# Patient Record
Sex: MALE | Race: WHITE | NOT HISPANIC OR LATINO | URBAN - NONMETROPOLITAN AREA
[De-identification: names, ages, dates, MRNs, and addresses within clinical notes are randomized per-mention and may not be internally consistent; named-entity substitution may affect disease eponyms.]

---

## 2023-12-26 RX ORDER — OMEPRAZOLE 20 MG/1
20 TABLET, DELAYED RELEASE ORAL
COMMUNITY

## 2023-12-26 RX ORDER — FLUOROURACIL 50 MG/G
CREAM TOPICAL 2 TIMES DAILY
COMMUNITY

## 2023-12-26 RX ORDER — TRAMADOL HYDROCHLORIDE 50 MG/1
50 TABLET ORAL
COMMUNITY

## 2023-12-26 RX ORDER — CYCLOBENZAPRINE HCL 5 MG
5 TABLET ORAL NIGHTLY
COMMUNITY

## 2023-12-26 RX ORDER — DICLOFENAC SODIUM 1 MG/ML
1 SOLUTION/ DROPS OPHTHALMIC 4 TIMES DAILY
COMMUNITY

## 2023-12-26 RX ORDER — ROSUVASTATIN CALCIUM 10 MG/1
10 TABLET, COATED ORAL DAILY
COMMUNITY

## 2023-12-26 RX ORDER — FUROSEMIDE 20 MG/1
20 TABLET ORAL DAILY
COMMUNITY

## 2023-12-26 RX ORDER — LISINOPRIL 40 MG/1
40 TABLET ORAL DAILY
COMMUNITY

## 2023-12-26 RX ORDER — SPIRONOLACTONE 25 MG/1
25 TABLET ORAL DAILY
COMMUNITY

## 2023-12-26 RX ORDER — CARVEDILOL 12.5 MG/1
TABLET ORAL
COMMUNITY
End: 2024-01-08 | Stop reason: DRUGHIGH

## 2024-01-08 ENCOUNTER — OFFICE VISIT (OUTPATIENT)
Dept: CARDIOLOGY | Facility: CLINIC | Age: 64
End: 2024-01-08
Payer: MEDICARE

## 2024-01-08 VITALS
HEIGHT: 68 IN | HEART RATE: 48 BPM | BODY MASS INDEX: 37.89 KG/M2 | DIASTOLIC BLOOD PRESSURE: 72 MMHG | SYSTOLIC BLOOD PRESSURE: 132 MMHG | WEIGHT: 250 LBS

## 2024-01-08 DIAGNOSIS — E78.2 MIXED HYPERLIPIDEMIA: ICD-10-CM

## 2024-01-08 DIAGNOSIS — I49.3 PVC (PREMATURE VENTRICULAR CONTRACTION): Primary | ICD-10-CM

## 2024-01-08 DIAGNOSIS — I42.8 NONISCHEMIC CARDIOMYOPATHY (MULTI): ICD-10-CM

## 2024-01-08 PROCEDURE — 99214 OFFICE O/P EST MOD 30 MIN: CPT | Performed by: INTERNAL MEDICINE

## 2024-01-08 PROCEDURE — 1036F TOBACCO NON-USER: CPT | Performed by: INTERNAL MEDICINE

## 2024-01-08 RX ORDER — CARVEDILOL 6.25 MG/1
6.25 TABLET ORAL
COMMUNITY

## 2024-01-08 ASSESSMENT — ENCOUNTER SYMPTOMS: DIZZINESS: 1

## 2024-01-08 NOTE — PROGRESS NOTES
"Subjective   Gopi Oquendo is a 63 y.o. male       Chief Complaint    Follow-up          HPI     Patient returns in follow-up of problems as noted.  He is doing well.  He denies any PVC symptomatology and he states [facility] other [facility] offices have noted that his rhythm is now regular without PVCs.  Because of this we suspect that treatment of his nonischemic cardiomyopathy with guideline directed therapy may have improved his cardiomyopathy and hence suppressed and/or eliminated a lot of the PVCs that seem to be associated with that pathophysiologic process.  Nonetheless it would be appropriate to reassess ejection fraction because it has been a year since it has been evaluated and previously it was only 40%.  He was educated regarding symptoms of heart failure and malignant arrhythmias to watch for and he states he has no such symptoms.    Treatment of matters such as blood pressure and lipids is reviewed and control is adequate and appropriate.  As before the merits of diet and weight loss were advocated.    Review of Systems   Neurological:  Positive for dizziness.   All other systems reviewed and are negative.         Visit Vitals  /72 (BP Location: Left arm, Patient Position: Sitting)   Pulse (!) 48   Ht 1.727 m (5' 8\")   Wt 113 kg (250 lb)   BMI 38.01 kg/m²   Smoking Status Never   BSA 2.33 m²        Objective   Physical Exam  Constitutional:       Appearance: Normal appearance. He is normal weight.   HENT:      Nose: Nose normal.   Neck:      Vascular: No carotid bruit.   Cardiovascular:      Rate and Rhythm: Normal rate.      Pulses: Normal pulses.      Heart sounds: Normal heart sounds.   Pulmonary:      Effort: Pulmonary effort is normal.   Abdominal:      General: Bowel sounds are normal.      Palpations: Abdomen is soft.   Genitourinary:     Rectum: Normal.   Musculoskeletal:         General: Normal range of motion.      Cervical back: Normal range of motion.      " Right lower leg: No edema.      Left lower leg: No edema.   Skin:     General: Skin is warm and dry.   Neurological:      General: No focal deficit present.      Mental Status: He is alert.   Psychiatric:         Mood and Affect: Mood normal.         Behavior: Behavior normal.         Thought Content: Thought content normal.         Judgment: Judgment normal.         Current Medications    Current Outpatient Medications:     abatacept (Orencia) 250 mg injection, Infuse 1,000 mg into a venous catheter 1 time.  Once a month, Disp: , Rfl:     carvedilol (Coreg) 6.25 mg tablet, Take 1 tablet (6.25 mg) by mouth 2 times a day with meals., Disp: , Rfl:     cyclobenzaprine (Flexeril) 5 mg tablet, Take 1 tablet (5 mg) by mouth once daily at bedtime., Disp: , Rfl:     diclofenac (Voltaren) 0.1 % ophthalmic solution, 1 drop 4 times a day., Disp: , Rfl:     fluorouracil (Efudex) 5 % cream, Apply topically 2 times a day., Disp: , Rfl:     furosemide (Lasix) 20 mg tablet, Take 1 tablet (20 mg) by mouth once daily., Disp: , Rfl:     lisinopril 40 mg tablet, Take 1 tablet (40 mg) by mouth once daily., Disp: , Rfl:     omeprazole OTC (PriLOSEC OTC) 20 mg EC tablet, Take 1 tablet (20 mg) by mouth once daily in the morning. Take before meals. Do not crush, chew, or split., Disp: , Rfl:     rosuvastatin (Crestor) 10 mg tablet, Take 1 tablet (10 mg) by mouth once daily., Disp: , Rfl:     spironolactone (Aldactone) 25 mg tablet, Take 1 tablet (25 mg) by mouth once daily., Disp: , Rfl:     traMADol (Ultram) 50 mg tablet, Take 1 tablet (50 mg) by mouth., Disp: , Rfl:        1. PVC (premature ventricular contraction)  No recurrence of PVC symptomatology.  Palpation suggest a significant reduced and PVC burden.        - Follow Up In Cardiology; Future  - Transthoracic Echo (TTE) Complete; Future    2. Nonischemic cardiomyopathy (CMS/HCC)  Previously ejection fraction 40%.  It has been a year since has been evaluated.  It would be appropriate  to determine if its gotten better or worse because obviously there is a change our plan of care.      - Follow Up In Cardiology; Future  - Transthoracic Echo (TTE) Complete; Future    3. Mixed hyperlipidemia  Adequate control on current therapy.

## 2024-01-08 NOTE — LETTER
"January 8, 2024     Erich Everett DO  [address] [facility] [id number]    Patient: Gopi Oquendo   YOB: 1960   Date of Visit: 1/8/2024       Dear Dr. Erich Everett DO:    Thank you for referring Gopi Oquendo to me for evaluation. Below are my notes for this consultation.  If you have questions, please do not hesitate to call me. I look forward to following your patient along with you.       Sincerely,     Don Altamirano MD      CC: No Recipients  ______________________________________________________________________________________    Subjective   Gopi Oquendo is a 63 y.o. male       Chief Complaint    Follow-up          HPI     Patient returns in follow-up of problems as noted.  He is doing well.  He denies any PVC symptomatology and he states that [facility] other [facility] have noted that his rhythm is now regular without PVCs.  Because of this we suspect that treatment of his nonischemic cardiomyopathy with guideline directed therapy may have improved his cardiomyopathy and hence suppressed and/or eliminated a lot of the PVCs that seem to be associated with that pathophysiologic process.  Nonetheless it would be appropriate to reassess ejection fraction because it has been a year since it has been evaluated and previously it was only 40%.  He was educated regarding symptoms of heart failure and malignant arrhythmias to watch for and he states he has no such symptoms.    Treatment of matters such as blood pressure and lipids is reviewed and control is adequate and appropriate.  As before the merits of diet and weight loss were advocated.    Review of Systems   Neurological:  Positive for dizziness.   All other systems reviewed and are negative.         Visit Vitals  /72 (BP Location: Left arm, Patient Position: Sitting)   Pulse (!) 48   Ht 1.727 m (5' 8\")   Wt 113 kg (250 lb)   BMI 38.01 kg/m²   Smoking Status Never "   BSA 2.33 m²        Objective   Physical Exam  Constitutional:       Appearance: Normal appearance. He is normal weight.   HENT:      Nose: Nose normal.   Neck:      Vascular: No carotid bruit.   Cardiovascular:      Rate and Rhythm: Normal rate.      Pulses: Normal pulses.      Heart sounds: Normal heart sounds.   Pulmonary:      Effort: Pulmonary effort is normal.   Abdominal:      General: Bowel sounds are normal.      Palpations: Abdomen is soft.   Genitourinary:     Rectum: Normal.   Musculoskeletal:         General: Normal range of motion.      Cervical back: Normal range of motion.      Right lower leg: No edema.      Left lower leg: No edema.   Skin:     General: Skin is warm and dry.   Neurological:      General: No focal deficit present.      Mental Status: He is alert.   Psychiatric:         Mood and Affect: Mood normal.         Behavior: Behavior normal.         Thought Content: Thought content normal.         Judgment: Judgment normal.         Current Medications    Current Outpatient Medications:   •  abatacept (Orencia) 250 mg injection, Infuse 1,000 mg into a venous catheter 1 time.  Once a month, Disp: , Rfl:   •  carvedilol (Coreg) 6.25 mg tablet, Take 1 tablet (6.25 mg) by mouth 2 times a day with meals., Disp: , Rfl:   •  cyclobenzaprine (Flexeril) 5 mg tablet, Take 1 tablet (5 mg) by mouth once daily at bedtime., Disp: , Rfl:   •  diclofenac (Voltaren) 0.1 % ophthalmic solution, 1 drop 4 times a day., Disp: , Rfl:   •  fluorouracil (Efudex) 5 % cream, Apply topically 2 times a day., Disp: , Rfl:   •  furosemide (Lasix) 20 mg tablet, Take 1 tablet (20 mg) by mouth once daily., Disp: , Rfl:   •  lisinopril 40 mg tablet, Take 1 tablet (40 mg) by mouth once daily., Disp: , Rfl:   •  omeprazole OTC (PriLOSEC OTC) 20 mg EC tablet, Take 1 tablet (20 mg) by mouth once daily in the morning. Take before meals. Do not crush, chew, or split., Disp: , Rfl:   •  rosuvastatin (Crestor) 10 mg tablet, Take 1  tablet (10 mg) by mouth once daily., Disp: , Rfl:   •  spironolactone (Aldactone) 25 mg tablet, Take 1 tablet (25 mg) by mouth once daily., Disp: , Rfl:   •  traMADol (Ultram) 50 mg tablet, Take 1 tablet (50 mg) by mouth., Disp: , Rfl:        1. PVC (premature ventricular contraction)  No recurrence of PVC symptomatology.  Palpation suggest a significant reduced and PVC burden.        - Follow Up In Cardiology; Future  - Transthoracic Echo (TTE) Complete; Future    2. Nonischemic cardiomyopathy (CMS/HCC)  Previously ejection fraction 40%.  It has been a year since has been evaluated.  It would be appropriate to determine if its gotten better or worse because obviously there is a change our plan of care.      - Follow Up In Cardiology; Future  - Transthoracic Echo (TTE) Complete; Future    3. Mixed hyperlipidemia  Adequate control on current therapy.

## 2024-01-24 ENCOUNTER — HOSPITAL ENCOUNTER (OUTPATIENT)
Dept: CARDIOLOGY | Facility: CLINIC | Age: 64
Discharge: HOME | End: 2024-01-24
Payer: MEDICARE

## 2024-01-24 VITALS
SYSTOLIC BLOOD PRESSURE: 134 MMHG | DIASTOLIC BLOOD PRESSURE: 68 MMHG | HEIGHT: 68 IN | BODY MASS INDEX: 37.89 KG/M2 | WEIGHT: 250 LBS

## 2024-01-24 DIAGNOSIS — I49.3 PVC (PREMATURE VENTRICULAR CONTRACTION): ICD-10-CM

## 2024-01-24 DIAGNOSIS — I42.8 NONISCHEMIC CARDIOMYOPATHY (MULTI): ICD-10-CM

## 2024-01-24 LAB
AORTIC VALVE MEAN GRADIENT: 2 MMHG
AORTIC VALVE PEAK VELOCITY: 1.14 M/S
AV PEAK GRADIENT: 5.2 MMHG
AVA (PEAK VEL): 3.44 CM2
AVA (VTI): 3.31 CM2
EJECTION FRACTION APICAL 4 CHAMBER: 65
LEFT VENTRICLE INTERNAL DIMENSION DIASTOLE: 6.1 CM (ref 3.5–6)
LEFT VENTRICULAR OUTFLOW TRACT DIAMETER: 2.3 CM
MITRAL VALVE E/A RATIO: 0.94
MITRAL VALVE E/E' RATIO: 12.8
RIGHT VENTRICLE PEAK SYSTOLIC PRESSURE: 24.9 MMHG

## 2024-01-24 PROCEDURE — 2500000004 HC RX 250 GENERAL PHARMACY W/ HCPCS (ALT 636 FOR OP/ED): Performed by: INTERNAL MEDICINE

## 2024-01-24 PROCEDURE — 93306 TTE W/DOPPLER COMPLETE: CPT | Performed by: INTERNAL MEDICINE

## 2024-01-24 PROCEDURE — 93306 TTE W/DOPPLER COMPLETE: CPT

## 2024-01-24 RX ADMIN — HUMAN ALBUMIN MICROSPHERES AND PERFLUTREN 0.7 ML: 10; .22 INJECTION, SOLUTION INTRAVENOUS at 11:17

## 2024-01-25 ENCOUNTER — TELEPHONE (OUTPATIENT)
Dept: CARDIOLOGY | Facility: CLINIC | Age: 64
End: 2024-01-25
Payer: MEDICARE

## 2024-01-25 NOTE — TELEPHONE ENCOUNTER
Phoned patient, line was disconnected. Phoned DTR diane's number no answer, left detailed vm advising of testing results and that we attempted to reach patient with no success.

## 2024-01-25 NOTE — TELEPHONE ENCOUNTER
----- Message from Don Altamirano MD sent at 1/25/2024  9:20 AM EST -----  Call patient and tell him that echocardiogram is now normal.  Continue same medicines.

## 2024-01-30 ENCOUNTER — APPOINTMENT (OUTPATIENT)
Dept: CARDIOLOGY | Facility: CLINIC | Age: 64
End: 2024-01-30
Payer: MEDICARE

## 2024-07-16 ENCOUNTER — APPOINTMENT (OUTPATIENT)
Dept: CARDIOLOGY | Facility: CLINIC | Age: 64
End: 2024-07-16
Payer: MEDICARE

## 2024-07-16 VITALS
SYSTOLIC BLOOD PRESSURE: 118 MMHG | WEIGHT: 220 LBS | HEART RATE: 78 BPM | BODY MASS INDEX: 33.34 KG/M2 | DIASTOLIC BLOOD PRESSURE: 84 MMHG | HEIGHT: 68 IN

## 2024-07-16 DIAGNOSIS — I10 ESSENTIAL HYPERTENSION: ICD-10-CM

## 2024-07-16 DIAGNOSIS — I49.3 PVC (PREMATURE VENTRICULAR CONTRACTION): ICD-10-CM

## 2024-07-16 DIAGNOSIS — Z78.9 NEVER SMOKED CIGARETTES: ICD-10-CM

## 2024-07-16 DIAGNOSIS — E78.2 MIXED HYPERLIPIDEMIA: Primary | ICD-10-CM

## 2024-07-16 DIAGNOSIS — I42.8 NONISCHEMIC CARDIOMYOPATHY (MULTI): ICD-10-CM

## 2024-07-16 PROCEDURE — 99214 OFFICE O/P EST MOD 30 MIN: CPT | Performed by: INTERNAL MEDICINE

## 2024-07-16 PROCEDURE — 3079F DIAST BP 80-89 MM HG: CPT | Performed by: INTERNAL MEDICINE

## 2024-07-16 PROCEDURE — 3074F SYST BP LT 130 MM HG: CPT | Performed by: INTERNAL MEDICINE

## 2024-07-16 PROCEDURE — 3008F BODY MASS INDEX DOCD: CPT | Performed by: INTERNAL MEDICINE

## 2024-07-16 RX ORDER — CARVEDILOL 6.25 MG/1
6.25 TABLET ORAL
Qty: 180 TABLET | Refills: 3 | Status: SHIPPED | OUTPATIENT
Start: 2024-07-16 | End: 2025-07-16

## 2024-07-16 RX ORDER — SPIRONOLACTONE 25 MG/1
25 TABLET ORAL DAILY
Qty: 90 TABLET | Refills: 3 | Status: SHIPPED | OUTPATIENT
Start: 2024-07-16 | End: 2025-07-16

## 2024-07-16 RX ORDER — SULFASALAZINE 500 MG/1
500 TABLET ORAL DAILY
COMMUNITY
Start: 2021-08-07

## 2024-07-16 RX ORDER — ALLOPURINOL 300 MG/1
300 TABLET ORAL 2 TIMES DAILY
COMMUNITY
Start: 2024-03-22

## 2024-07-16 RX ORDER — FUROSEMIDE 20 MG/1
20 TABLET ORAL DAILY
Qty: 90 TABLET | Refills: 3 | Status: SHIPPED | OUTPATIENT
Start: 2024-07-16 | End: 2025-07-16

## 2024-07-16 RX ORDER — LISINOPRIL 10 MG/1
10 TABLET ORAL DAILY
Qty: 30 TABLET | Refills: 11 | Status: SHIPPED | OUTPATIENT
Start: 2024-07-16 | End: 2024-07-16

## 2024-07-16 RX ORDER — LISINOPRIL 10 MG/1
10 TABLET ORAL DAILY
Qty: 90 TABLET | Refills: 3 | Status: SHIPPED | OUTPATIENT
Start: 2024-07-16

## 2024-07-16 RX ORDER — ROSUVASTATIN CALCIUM 10 MG/1
10 TABLET, COATED ORAL DAILY
Qty: 90 TABLET | Refills: 3 | Status: SHIPPED | OUTPATIENT
Start: 2024-07-16 | End: 2025-07-16

## 2024-07-16 RX ORDER — HYDROXYCHLOROQUINE SULFATE 200 MG/1
200 TABLET, FILM COATED ORAL DAILY
COMMUNITY
Start: 2024-04-18

## 2024-07-16 NOTE — PATIENT INSTRUCTIONS
Please bring all medicines, vitamins, and herbal supplements with you when you come to the office.    Prescriptions will not be filled unless you are compliant with your follow up appointments or have a follow up appointment scheduled as per instruction of your physician. Refills should be requested at the time of your visit.   BMI was above normal measurement. Current weight: 99.8 kg (220 lb)  Weight change since last visit (-) denotes wt loss -30 lbs   Weight loss needed to achieve BMI 25: 55.9 Lbs  Weight loss needed to achieve BMI 30: 23.1 Lbs  Advised to Increase physical activity.

## 2024-07-16 NOTE — PROGRESS NOTES
"Subjective   Gopi Oquendo is a 63 y.o. male       Chief Complaint    Follow-up          HPI     Review of Systems   All other systems reviewed and are negative.     Patient returns in follow-up of problems as noted.  He is doing well.  From time to time he is orthostatic symptoms.  He believes its on the basis of weight loss and I concur.  Because of this I recommended reduction in lisinopril dosage from 40 mg to 10 mg a day.  Other medicines as before.    His nonischemic cardiomyopathy is asymptomatic and functional class I.  Recent assessment of ejection fraction favorable.  No longer has PVC symptomatology.  Overall it appears that lipids and blood pressure are adequately controlled and because of this we make no other adjustments other than the reduction in lisinopril.  We did advocate the continued efforts of weight loss that he has implemented.      Vitals:    07/16/24 0957   BP: 118/84   BP Location: Left arm   Patient Position: Sitting   Pulse: 78   Weight: 99.8 kg (220 lb)   Height: 1.727 m (5' 8\")        Objective   Physical Exam  Constitutional:       Appearance: Normal appearance.   HENT:      Nose: Nose normal.   Neck:      Vascular: No carotid bruit.   Cardiovascular:      Rate and Rhythm: Normal rate.      Pulses: Normal pulses.      Heart sounds: Normal heart sounds.   Pulmonary:      Effort: Pulmonary effort is normal.   Abdominal:      General: Bowel sounds are normal.      Palpations: Abdomen is soft.   Musculoskeletal:         General: Normal range of motion.      Cervical back: Normal range of motion.      Right lower leg: No edema.      Left lower leg: No edema.   Skin:     General: Skin is warm and dry.   Neurological:      General: No focal deficit present.      Mental Status: He is alert.   Psychiatric:         Mood and Affect: Mood normal.         Behavior: Behavior normal.         Thought Content: Thought content normal.         Judgment: Judgment normal.         Allergies  Patient " has no known allergies.     Current Medications    Current Outpatient Medications:     abatacept (Orencia) 250 mg injection, Infuse 1,000 mg into a venous catheter 1 time.  Once a month, Disp: , Rfl:     allopurinol (Zyloprim) 300 mg tablet, Take 1 tablet (300 mg) by mouth 2 times a day., Disp: , Rfl:     carvedilol (Coreg) 6.25 mg tablet, Take 1 tablet (6.25 mg) by mouth 2 times daily (morning and late afternoon)., Disp: , Rfl:     cyclobenzaprine (Flexeril) 5 mg tablet, Take 1 tablet (5 mg) by mouth as needed at bedtime., Disp: , Rfl:     diclofenac (Voltaren) 0.1 % ophthalmic solution, 1 drop 4 times a day., Disp: , Rfl:     fluorouracil (Efudex) 5 % cream, Apply topically 2 times a day., Disp: , Rfl:     furosemide (Lasix) 20 mg tablet, Take 1 tablet (20 mg) by mouth once daily., Disp: , Rfl:     hydroxychloroquine (Plaquenil) 200 mg tablet, Take 1 tablet (200 mg) by mouth once daily., Disp: , Rfl:     lisinopril 40 mg tablet, Take 1 tablet (40 mg) by mouth once daily., Disp: , Rfl:     omeprazole OTC (PriLOSEC OTC) 20 mg EC tablet, Take 1 tablet (20 mg) by mouth once daily in the morning. Take before meals. Do not crush, chew, or split., Disp: , Rfl:     rosuvastatin (Crestor) 10 mg tablet, Take 1 tablet (10 mg) by mouth once daily., Disp: , Rfl:     spironolactone (Aldactone) 25 mg tablet, Take 1 tablet (25 mg) by mouth once daily., Disp: , Rfl:     sulfaSALAzine (Azulfidine) 500 mg tablet, Take 1 tablet (500 mg) by mouth early in the morning.., Disp: , Rfl:     traMADol (Ultram) 50 mg tablet, Take 1 tablet (50 mg) by mouth., Disp: , Rfl:                      Assessment/Plan   1. PVC (premature ventricular contraction)  No recurrence.  We believe they have gotten better as his cardiomyopathy improved.  - Follow Up In Cardiology    2. Nonischemic cardiomyopathy (Multi)  Now functional class I.  Ejection fraction improved on therapy.  - Follow Up In Cardiology    3. Mixed hyperlipidemia  Review of treatment  strategy demonstrates good control    4. BMI 33.0-33.9,adult  Congratulated on weight loss and encouraged to continue with losing weight    5. Never smoked cigarettes  Noted    6. Essential hypertension  Blood pressure lower than before with weight loss.  Because of this we reduced his lisinopril dosage.             Scribe Attestation  By signing my name below, I, Jerry Ferreira RN   , Scribe   attest that this documentation has been prepared under the direction and in the presence of oDn Altamirano MD.     Provider Attestation - Scribe documentation    All medical record entries made by the Scribe were at my direction and personally dictated by me. I have reviewed the chart and agree that the record accurately reflects my personal performance of the history, physical exam, discussion and plan.

## 2024-07-16 NOTE — LETTER
"July 17, 2024     Erich Everett DO  [address] [facility] [address]    Patient: Gopi Oquendo   YOB: 1960   Date of Visit: 7/16/2024       Dear Dr. Erich Everett, DO:    Thank you for referring Gopi Oquendo to me for evaluation. Below are my notes for this consultation.  If you have questions, please do not hesitate to call me. I look forward to following your patient along with you.       Sincerely,     Don Altamirano MD      CC: No Recipients  ______________________________________________________________________________________    Subjective   Gopi Oquendo is a 63 y.o. male       Chief Complaint    Follow-up          HPI     Review of Systems   All other systems reviewed and are negative.     Patient returns in follow-up of problems as noted.  He is doing well.  From time to time he is orthostatic symptoms.  He believes its on the basis of weight loss and I concur.  Because of this I recommended reduction in lisinopril dosage from 40 mg to 10 mg a day.  Other medicines as before.    His nonischemic cardiomyopathy is asymptomatic and functional class I.  Recent assessment of ejection fraction favorable.  No longer has PVC symptomatology.  Overall it appears that lipids and blood pressure are adequately controlled and because of this we make no other adjustments other than the reduction in lisinopril.  We did advocate the continued efforts of weight loss that he has implemented.      Vitals:    07/16/24 0957   BP: 118/84   BP Location: Left arm   Patient Position: Sitting   Pulse: 78   Weight: 99.8 kg (220 lb)   Height: 1.727 m (5' 8\")        Objective   Physical Exam  Constitutional:       Appearance: Normal appearance.   HENT:      Nose: Nose normal.   Neck:      Vascular: No carotid bruit.   Cardiovascular:      Rate and Rhythm: Normal rate.      Pulses: Normal pulses.      Heart sounds: Normal heart sounds.   Pulmonary:      Effort: Pulmonary effort is normal. "   Abdominal:      General: Bowel sounds are normal.      Palpations: Abdomen is soft.   Musculoskeletal:         General: Normal range of motion.      Cervical back: Normal range of motion.      Right lower leg: No edema.      Left lower leg: No edema.   Skin:     General: Skin is warm and dry.   Neurological:      General: No focal deficit present.      Mental Status: He is alert.   Psychiatric:         Mood and Affect: Mood normal.         Behavior: Behavior normal.         Thought Content: Thought content normal.         Judgment: Judgment normal.         Allergies  Patient has no known allergies.     Current Medications    Current Outpatient Medications:   •  abatacept (Orencia) 250 mg injection, Infuse 1,000 mg into a venous catheter 1 time.  Once a month, Disp: , Rfl:   •  allopurinol (Zyloprim) 300 mg tablet, Take 1 tablet (300 mg) by mouth 2 times a day., Disp: , Rfl:   •  carvedilol (Coreg) 6.25 mg tablet, Take 1 tablet (6.25 mg) by mouth 2 times daily (morning and late afternoon)., Disp: , Rfl:   •  cyclobenzaprine (Flexeril) 5 mg tablet, Take 1 tablet (5 mg) by mouth as needed at bedtime., Disp: , Rfl:   •  diclofenac (Voltaren) 0.1 % ophthalmic solution, 1 drop 4 times a day., Disp: , Rfl:   •  fluorouracil (Efudex) 5 % cream, Apply topically 2 times a day., Disp: , Rfl:   •  furosemide (Lasix) 20 mg tablet, Take 1 tablet (20 mg) by mouth once daily., Disp: , Rfl:   •  hydroxychloroquine (Plaquenil) 200 mg tablet, Take 1 tablet (200 mg) by mouth once daily., Disp: , Rfl:   •  lisinopril 40 mg tablet, Take 1 tablet (40 mg) by mouth once daily., Disp: , Rfl:   •  omeprazole OTC (PriLOSEC OTC) 20 mg EC tablet, Take 1 tablet (20 mg) by mouth once daily in the morning. Take before meals. Do not crush, chew, or split., Disp: , Rfl:   •  rosuvastatin (Crestor) 10 mg tablet, Take 1 tablet (10 mg) by mouth once daily., Disp: , Rfl:   •  spironolactone (Aldactone) 25 mg tablet, Take 1 tablet (25 mg) by mouth once  daily., Disp: , Rfl:   •  sulfaSALAzine (Azulfidine) 500 mg tablet, Take 1 tablet (500 mg) by mouth early in the morning.., Disp: , Rfl:   •  traMADol (Ultram) 50 mg tablet, Take 1 tablet (50 mg) by mouth., Disp: , Rfl:                      Assessment/Plan   1. PVC (premature ventricular contraction)  No recurrence.  We believe they have gotten better as his cardiomyopathy improved.  - Follow Up In Cardiology    2. Nonischemic cardiomyopathy (Multi)  Now functional class I.  Ejection fraction improved on therapy.  - Follow Up In Cardiology    3. Mixed hyperlipidemia  Review of treatment strategy demonstrates good control    4. BMI 33.0-33.9,adult  Congratulated on weight loss and encouraged to continue with losing weight    5. Never smoked cigarettes  Noted    6. Essential hypertension  Blood pressure lower than before with weight loss.  Because of this we reduced his lisinopril dosage.             Scribe Attestation  By signing my name below, I, Jerry Ferreira RN   , Scribe   attest that this documentation has been prepared under the direction and in the presence of Don Altamirano MD.     Provider Attestation - Scribe documentation    All medical record entries made by the Scribe were at my direction and personally dictated by me. I have reviewed the chart and agree that the record accurately reflects my personal performance of the history, physical exam, discussion and plan.

## 2025-01-15 ENCOUNTER — APPOINTMENT (OUTPATIENT)
Dept: CARDIOLOGY | Facility: CLINIC | Age: 65
End: 2025-01-15
Payer: MEDICARE

## 2025-01-15 VITALS
WEIGHT: 239.6 LBS | HEIGHT: 68 IN | BODY MASS INDEX: 36.31 KG/M2 | HEART RATE: 72 BPM | DIASTOLIC BLOOD PRESSURE: 80 MMHG | SYSTOLIC BLOOD PRESSURE: 110 MMHG

## 2025-01-15 DIAGNOSIS — E78.2 MIXED HYPERLIPIDEMIA: ICD-10-CM

## 2025-01-15 DIAGNOSIS — I42.8 NONISCHEMIC CARDIOMYOPATHY (MULTI): Primary | ICD-10-CM

## 2025-01-15 DIAGNOSIS — I10 ESSENTIAL HYPERTENSION: ICD-10-CM

## 2025-01-15 DIAGNOSIS — I49.3 PVC (PREMATURE VENTRICULAR CONTRACTION): ICD-10-CM

## 2025-01-15 DIAGNOSIS — Z78.9 NEVER SMOKED CIGARETTES: ICD-10-CM

## 2025-01-15 PROCEDURE — 3008F BODY MASS INDEX DOCD: CPT | Performed by: INTERNAL MEDICINE

## 2025-01-15 PROCEDURE — 93000 ELECTROCARDIOGRAM COMPLETE: CPT | Performed by: INTERNAL MEDICINE

## 2025-01-15 PROCEDURE — 99213 OFFICE O/P EST LOW 20 MIN: CPT | Performed by: INTERNAL MEDICINE

## 2025-01-15 PROCEDURE — 1036F TOBACCO NON-USER: CPT | Performed by: INTERNAL MEDICINE

## 2025-01-15 PROCEDURE — G2211 COMPLEX E/M VISIT ADD ON: HCPCS | Performed by: INTERNAL MEDICINE

## 2025-01-15 PROCEDURE — 3074F SYST BP LT 130 MM HG: CPT | Performed by: INTERNAL MEDICINE

## 2025-01-15 PROCEDURE — 3079F DIAST BP 80-89 MM HG: CPT | Performed by: INTERNAL MEDICINE

## 2025-01-15 RX ORDER — SPIRONOLACTONE 25 MG/1
25 TABLET ORAL DAILY
Qty: 90 TABLET | Refills: 3 | Status: SHIPPED | OUTPATIENT
Start: 2025-01-15 | End: 2026-01-15

## 2025-01-15 RX ORDER — ROSUVASTATIN CALCIUM 10 MG/1
10 TABLET, COATED ORAL DAILY
Qty: 90 TABLET | Refills: 3 | Status: SHIPPED | OUTPATIENT
Start: 2025-01-15 | End: 2026-01-15

## 2025-01-15 RX ORDER — CARVEDILOL 6.25 MG/1
6.25 TABLET ORAL
Qty: 180 TABLET | Refills: 3 | Status: SHIPPED | OUTPATIENT
Start: 2025-01-15 | End: 2026-01-15

## 2025-01-15 RX ORDER — FUROSEMIDE 20 MG/1
20 TABLET ORAL DAILY
Qty: 90 TABLET | Refills: 3 | Status: SHIPPED | OUTPATIENT
Start: 2025-01-15 | End: 2026-01-15

## 2025-01-15 RX ORDER — LISINOPRIL 10 MG/1
10 TABLET ORAL DAILY
Qty: 90 TABLET | Refills: 3 | Status: SHIPPED | OUTPATIENT
Start: 2025-01-15

## 2025-01-15 NOTE — LETTER
January 15, 2025     Erich Eveertt DO  [address] [facility] [address]    Patient: Gopi Oquendo   YOB: 1960   Date of Visit: 1/15/2025       Dear Dr. Erich Everett DO:    Thank you for referring Gopi Oquendo to me for evaluation. Below are my notes for this consultation.  If you have questions, please do not hesitate to call me. I look forward to following your patient along with you.       Sincerely,     Julisa Nicole MD      CC: No Recipients  ______________________________________________________________________________________    Subjective   Gopi Oquendo is a 64 y.o. male       Chief Complaint    Follow-up          HPI   Patient is here for follow-up continue management for nonischemic cardiomyopathy and PVCs.  He is a former patient of Dr. Altamirano.  He was diagnosed with nonischemic cardiomyopathy.  His EF has improved and last echocardiogram was normal.  I could not find any previous ischemic evaluation however the patient denies chest pain, palpitation, lightheadedness, dizziness or syncope.  The patient in the past has documentation of PVCs that he report he does not notice any palpitation.  An examination was normal and his EKG was normal.    Assessment    1.  History of presumed nonischemic cardiomyopathy improved with medical therapy last ejection fraction was normal.  No previous ischemic evaluation was done but the patient described functional class I and no anginal symptoms  2.  Previous documentation of ventricular ectopic beats he report those followed a COVID infection.  Documented on Holter monitor several years ago.  The patient denies any palpitation  3.  Mild obesity  4.  Mixed hyperlipidemia  5.  History of rheumatoid arthritis    Plan    1.  Patient appears stable without any cardiac symptoms.  Last echocardiogram showed improvement with normal ejection fraction.  Today EKG showed normal EKG with normal QTc interval and no PVCs  2.  I offered the  "patient to repeat his Holter monitor but he elected to defer  3.  I advised him to notify me change in cardiac status or symptoms  4.  We discussed risk factor modification and the importance of exercise and losing weight  5.  I will see him back in the office in 1 year in follow-up  Review of Systems   All other systems reviewed and are negative.           Vitals:    01/15/25 1311   BP: 110/80   BP Location: Left arm   Patient Position: Sitting   Pulse: 72   Weight: 109 kg (239 lb 9.6 oz)   Height: 1.727 m (5' 8\")        Objective   Physical Exam  Constitutional:       Appearance: Normal appearance.   HENT:      Nose: Nose normal.   Neck:      Vascular: No carotid bruit.   Cardiovascular:      Rate and Rhythm: Normal rate.      Pulses: Normal pulses.      Heart sounds: Normal heart sounds.   Pulmonary:      Effort: Pulmonary effort is normal.   Abdominal:      General: Bowel sounds are normal.      Palpations: Abdomen is soft.   Musculoskeletal:         General: Normal range of motion.      Cervical back: Normal range of motion.      Right lower leg: No edema.      Left lower leg: No edema.   Skin:     General: Skin is warm and dry.   Neurological:      General: No focal deficit present.      Mental Status: He is alert.   Psychiatric:         Mood and Affect: Mood normal.         Behavior: Behavior normal.         Thought Content: Thought content normal.         Judgment: Judgment normal.         Allergies  Patient has no known allergies.     Current Medications    Current Outpatient Medications:   •  abatacept (Orencia) 250 mg injection, Infuse 1,000 mg into a venous catheter 1 time.  Once a month, Disp: , Rfl:   •  allopurinol (Zyloprim) 300 mg tablet, Take 1 tablet (300 mg) by mouth 2 times a day., Disp: , Rfl:   •  cyclobenzaprine (Flexeril) 5 mg tablet, Take 1 tablet (5 mg) by mouth as needed at bedtime., Disp: , Rfl:   •  diclofenac (Voltaren) 0.1 % ophthalmic solution, 1 drop 4 times a day., Disp: , Rfl:   • "  fluorouracil (Efudex) 5 % cream, Apply topically 2 times a day., Disp: , Rfl:   •  hydroxychloroquine (Plaquenil) 200 mg tablet, Take 1 tablet (200 mg) by mouth once daily., Disp: , Rfl:   •  omeprazole OTC (PriLOSEC OTC) 20 mg EC tablet, Take 1 tablet (20 mg) by mouth once daily in the morning. Take before meals. Do not crush, chew, or split., Disp: , Rfl:   •  sulfaSALAzine (Azulfidine) 500 mg tablet, Take 1 tablet (500 mg) by mouth early in the morning.., Disp: , Rfl:   •  traMADol (Ultram) 50 mg tablet, Take 1 tablet (50 mg) by mouth., Disp: , Rfl:   •  carvedilol (Coreg) 6.25 mg tablet, Take 1 tablet (6.25 mg) by mouth 2 times daily (morning and late afternoon)., Disp: 180 tablet, Rfl: 3  •  furosemide (Lasix) 20 mg tablet, Take 1 tablet (20 mg) by mouth once daily., Disp: 90 tablet, Rfl: 3  •  lisinopril 10 mg tablet, Take 1 tablet (10 mg) by mouth once daily., Disp: 90 tablet, Rfl: 3  •  rosuvastatin (Crestor) 10 mg tablet, Take 1 tablet (10 mg) by mouth once daily., Disp: 90 tablet, Rfl: 3  •  spironolactone (Aldactone) 25 mg tablet, Take 1 tablet (25 mg) by mouth once daily., Disp: 90 tablet, Rfl: 3                     Assessment/Plan   1. Nonischemic cardiomyopathy (Multi)  Follow Up In Cardiology    Follow Up In Cardiology    carvedilol (Coreg) 6.25 mg tablet    furosemide (Lasix) 20 mg tablet    lisinopril 10 mg tablet    spironolactone (Aldactone) 25 mg tablet      2. PVC (premature ventricular contraction)  ECG 12 Lead      3. Mixed hyperlipidemia  rosuvastatin (Crestor) 10 mg tablet      4. BMI 36.0-36.9,adult        5. Never smoked cigarettes        6. Essential hypertension  carvedilol (Coreg) 6.25 mg tablet    furosemide (Lasix) 20 mg tablet    lisinopril 10 mg tablet               Scribe Attestation  By signing my name below, rica MENJIVAR Scribe   attest that this documentation has been prepared under the direction and in the presence of Julisa Nicole MD.     Provider Attestation - Scribe  documentation    All medical record entries made by the Scribe were at my direction and personally dictated by me. I have reviewed the chart and agree that the record accurately reflects my personal performance of the history, physical exam, discussion and plan.

## 2025-01-15 NOTE — PROGRESS NOTES
"Subjective   Gopi Oquendo is a 64 y.o. male       Chief Complaint    Follow-up          HPI   Patient is here for follow-up continue management for nonischemic cardiomyopathy and PVCs.  He is a former patient of Dr. Altamirano.  He was diagnosed with nonischemic cardiomyopathy.  His EF has improved and last echocardiogram was normal.  I could not find any previous ischemic evaluation however the patient denies chest pain, palpitation, lightheadedness, dizziness or syncope.  The patient in the past has documentation of PVCs that he report he does not notice any palpitation.  An examination was normal and his EKG was normal.    Assessment    1.  History of presumed nonischemic cardiomyopathy improved with medical therapy last ejection fraction was normal.  No previous ischemic evaluation was done but the patient described functional class I and no anginal symptoms  2.  Previous documentation of ventricular ectopic beats he report those followed a COVID infection.  Documented on Holter monitor several years ago.  The patient denies any palpitation  3.  Mild obesity  4.  Mixed hyperlipidemia  5.  History of rheumatoid arthritis    Plan    1.  Patient appears stable without any cardiac symptoms.  Last echocardiogram showed improvement with normal ejection fraction.  Today EKG showed normal EKG with normal QTc interval and no PVCs  2.  I offered the patient to repeat his Holter monitor but he elected to defer  3.  I advised him to notify me change in cardiac status or symptoms  4.  We discussed risk factor modification and the importance of exercise and losing weight  5.  I will see him back in the office in 1 year in follow-up  Review of Systems   All other systems reviewed and are negative.           Vitals:    01/15/25 1311   BP: 110/80   BP Location: Left arm   Patient Position: Sitting   Pulse: 72   Weight: 109 kg (239 lb 9.6 oz)   Height: 1.727 m (5' 8\")        Objective   Physical Exam  Constitutional:       " Appearance: Normal appearance.   HENT:      Nose: Nose normal.   Neck:      Vascular: No carotid bruit.   Cardiovascular:      Rate and Rhythm: Normal rate.      Pulses: Normal pulses.      Heart sounds: Normal heart sounds.   Pulmonary:      Effort: Pulmonary effort is normal.   Abdominal:      General: Bowel sounds are normal.      Palpations: Abdomen is soft.   Musculoskeletal:         General: Normal range of motion.      Cervical back: Normal range of motion.      Right lower leg: No edema.      Left lower leg: No edema.   Skin:     General: Skin is warm and dry.   Neurological:      General: No focal deficit present.      Mental Status: He is alert.   Psychiatric:         Mood and Affect: Mood normal.         Behavior: Behavior normal.         Thought Content: Thought content normal.         Judgment: Judgment normal.         Allergies  Patient has no known allergies.     Current Medications    Current Outpatient Medications:     abatacept (Orencia) 250 mg injection, Infuse 1,000 mg into a venous catheter 1 time.  Once a month, Disp: , Rfl:     allopurinol (Zyloprim) 300 mg tablet, Take 1 tablet (300 mg) by mouth 2 times a day., Disp: , Rfl:     cyclobenzaprine (Flexeril) 5 mg tablet, Take 1 tablet (5 mg) by mouth as needed at bedtime., Disp: , Rfl:     diclofenac (Voltaren) 0.1 % ophthalmic solution, 1 drop 4 times a day., Disp: , Rfl:     fluorouracil (Efudex) 5 % cream, Apply topically 2 times a day., Disp: , Rfl:     hydroxychloroquine (Plaquenil) 200 mg tablet, Take 1 tablet (200 mg) by mouth once daily., Disp: , Rfl:     omeprazole OTC (PriLOSEC OTC) 20 mg EC tablet, Take 1 tablet (20 mg) by mouth once daily in the morning. Take before meals. Do not crush, chew, or split., Disp: , Rfl:     sulfaSALAzine (Azulfidine) 500 mg tablet, Take 1 tablet (500 mg) by mouth early in the morning.., Disp: , Rfl:     traMADol (Ultram) 50 mg tablet, Take 1 tablet (50 mg) by mouth., Disp: , Rfl:     carvedilol (Coreg)  6.25 mg tablet, Take 1 tablet (6.25 mg) by mouth 2 times daily (morning and late afternoon)., Disp: 180 tablet, Rfl: 3    furosemide (Lasix) 20 mg tablet, Take 1 tablet (20 mg) by mouth once daily., Disp: 90 tablet, Rfl: 3    lisinopril 10 mg tablet, Take 1 tablet (10 mg) by mouth once daily., Disp: 90 tablet, Rfl: 3    rosuvastatin (Crestor) 10 mg tablet, Take 1 tablet (10 mg) by mouth once daily., Disp: 90 tablet, Rfl: 3    spironolactone (Aldactone) 25 mg tablet, Take 1 tablet (25 mg) by mouth once daily., Disp: 90 tablet, Rfl: 3                     Assessment/Plan   1. Nonischemic cardiomyopathy (Multi)  Follow Up In Cardiology    Follow Up In Cardiology    carvedilol (Coreg) 6.25 mg tablet    furosemide (Lasix) 20 mg tablet    lisinopril 10 mg tablet    spironolactone (Aldactone) 25 mg tablet      2. PVC (premature ventricular contraction)  ECG 12 Lead      3. Mixed hyperlipidemia  rosuvastatin (Crestor) 10 mg tablet      4. BMI 36.0-36.9,adult        5. Never smoked cigarettes        6. Essential hypertension  carvedilol (Coreg) 6.25 mg tablet    furosemide (Lasix) 20 mg tablet    lisinopril 10 mg tablet               Scribe Attestation  By signing my name below, rica MENJIVAR Scribe   attest that this documentation has been prepared under the direction and in the presence of Julisa Nicole MD.     Provider Attestation - Scribe documentation    All medical record entries made by the Eliotibe were at my direction and personally dictated by me. I have reviewed the chart and agree that the record accurately reflects my personal performance of the history, physical exam, discussion and plan.

## 2025-01-15 NOTE — PATIENT INSTRUCTIONS
Please bring all medicines, vitamins, and herbal supplements with you when you come to the office.    Prescriptions will not be filled unless you are compliant with your follow up appointments or have a follow up appointment scheduled as per instruction of your physician. Refills should be requested at the time of your visit.     BMI was above normal measurement. Current weight: 109 kg (239 lb 9.6 oz)  Weight change since last visit (-) denotes wt loss 19.6 lbs   Weight loss needed to achieve BMI 25: 75.5 Lbs  Weight loss needed to achieve BMI 30: 42.7 Lbs  Provided instructions on dietary changes  Provided instructions on exercise.    One year

## 2025-01-20 ENCOUNTER — APPOINTMENT (OUTPATIENT)
Dept: CARDIOLOGY | Facility: CLINIC | Age: 65
End: 2025-01-20
Payer: MEDICARE

## 2026-01-16 ENCOUNTER — APPOINTMENT (OUTPATIENT)
Dept: CARDIOLOGY | Facility: CLINIC | Age: 66
End: 2026-01-16
Payer: MEDICARE